# Patient Record
Sex: FEMALE | Race: WHITE | Employment: FULL TIME | URBAN - NONMETROPOLITAN AREA
[De-identification: names, ages, dates, MRNs, and addresses within clinical notes are randomized per-mention and may not be internally consistent; named-entity substitution may affect disease eponyms.]

---

## 2023-11-10 ENCOUNTER — OFFICE VISIT (OUTPATIENT)
Dept: PRIMARY CARE CLINIC | Age: 33
End: 2023-11-10
Payer: COMMERCIAL

## 2023-11-10 VITALS
SYSTOLIC BLOOD PRESSURE: 112 MMHG | DIASTOLIC BLOOD PRESSURE: 74 MMHG | WEIGHT: 196 LBS | TEMPERATURE: 98.3 F | OXYGEN SATURATION: 98 % | HEART RATE: 70 BPM

## 2023-11-10 DIAGNOSIS — H92.01 RIGHT EAR PAIN: Primary | ICD-10-CM

## 2023-11-10 PROBLEM — G40.909 SEIZURE DISORDER (HCC): Status: ACTIVE | Noted: 2023-11-10

## 2023-11-10 PROCEDURE — 99203 OFFICE O/P NEW LOW 30 MIN: CPT | Performed by: FAMILY MEDICINE

## 2023-11-10 RX ORDER — FOLIC ACID 1 MG/1
TABLET ORAL
COMMUNITY
Start: 2023-11-10

## 2023-11-10 RX ORDER — FLUTICASONE PROPIONATE 50 MCG
2 SPRAY, SUSPENSION (ML) NASAL DAILY
Qty: 16 G | Refills: 0 | Status: SHIPPED | OUTPATIENT
Start: 2023-11-10

## 2023-11-10 RX ORDER — LEVETIRACETAM 500 MG/1
500 TABLET ORAL 2 TIMES DAILY
COMMUNITY

## 2023-11-10 ASSESSMENT — PATIENT HEALTH QUESTIONNAIRE - PHQ9
SUM OF ALL RESPONSES TO PHQ QUESTIONS 1-9: 0
SUM OF ALL RESPONSES TO PHQ9 QUESTIONS 1 & 2: 0
2. FEELING DOWN, DEPRESSED OR HOPELESS: 0
SUM OF ALL RESPONSES TO PHQ QUESTIONS 1-9: 0
1. LITTLE INTEREST OR PLEASURE IN DOING THINGS: 0

## 2023-11-10 NOTE — PROGRESS NOTES
[id number] [facility]             [address] [facility] [place], [address] [facility]                        Telephone [phone]             Fax [phone]       Bryon Mendoza  :  1990  Age:  29 y.o. MRN:  [id number]  Date of visit:  11/10/2023       Assessment & Plan:    Right ear pain  Eustachian tube dysfunction vs. other  I advised the patient that there was no foreign body in the right external auditory canal.  There is no evidence of infection or inflammation of the right external auditory canal.    Flonase was prescribed:  - fluticasone (FLONASE) 50 MCG/ACT nasal spray; 2 sprays by Each Nostril route daily  Dispense: 16 g; Refill: 0    Printed information regarding Eustachian Tube Problems was provided to the patient with the after visit summary. She was advised to follow up if symptoms worsen or do not resolve. Subjective:    Bryon Mendoza is a 29 y.o. female who presents to [id number] today (11/10/2023) for evaluation of:  Otalgia (Right ear, on  felt like bug had flew into ear)      She states that a bug flew in her right ear on 2023. She has had discomfort in the ear since this happened. She states that the pain has been getting worse. She states that she has had intermittently had decreased hearing. She denies fever or drainage from the ear. Current medications are:  Current Outpatient Medications   Medication Sig Dispense Refill    folic acid (FOLVITE) 1 MG tablet       levETIRAcetam (KEPPRA) 500 MG tablet Take 1 tablet by mouth 2 times daily 2 tablets       No current facility-administered medications for this visit. She is allergic to biaxin [clarithromycin], ceclor [cefaclor], pcn [penicillins], and sulfa antibiotics. She has the following problem list:  Patient Active Problem List   Diagnosis    Seizure disorder [facility])        She  reports that she has never smoked.  She has never used